# Patient Record
Sex: FEMALE | Race: WHITE | HISPANIC OR LATINO | URBAN - METROPOLITAN AREA
[De-identification: names, ages, dates, MRNs, and addresses within clinical notes are randomized per-mention and may not be internally consistent; named-entity substitution may affect disease eponyms.]

---

## 2023-11-07 ENCOUNTER — APPOINTMENT (OUTPATIENT)
Dept: OBSTETRICS AND GYNECOLOGY | Facility: CLINIC | Age: 58
End: 2023-11-07
Payer: COMMERCIAL

## 2023-11-20 ENCOUNTER — OFFICE VISIT (OUTPATIENT)
Dept: OBSTETRICS AND GYNECOLOGY | Facility: CLINIC | Age: 58
End: 2023-11-20
Payer: COMMERCIAL

## 2023-11-20 VITALS
WEIGHT: 204 LBS | DIASTOLIC BLOOD PRESSURE: 78 MMHG | SYSTOLIC BLOOD PRESSURE: 132 MMHG | HEIGHT: 62 IN | BODY MASS INDEX: 37.54 KG/M2

## 2023-11-20 DIAGNOSIS — R10.2 PAIN IN FEMALE PELVIS: Primary | ICD-10-CM

## 2023-11-20 DIAGNOSIS — N83.201 CYSTS OF BOTH OVARIES: ICD-10-CM

## 2023-11-20 DIAGNOSIS — N83.202 CYSTS OF BOTH OVARIES: ICD-10-CM

## 2023-11-20 PROCEDURE — 99204 OFFICE O/P NEW MOD 45 MIN: CPT | Performed by: OBSTETRICS & GYNECOLOGY

## 2023-11-20 PROCEDURE — 1036F TOBACCO NON-USER: CPT | Performed by: OBSTETRICS & GYNECOLOGY

## 2023-11-20 NOTE — PROGRESS NOTES
Subjective   Patient ID: Ava Turner is a 58 y.o. female who presents for Pelvic Pain.  stion  Information displayed in this report may not trend or trigger automated decision support.    US FEMALE PELVIS TRANSABD LTD  Order: [id number]  Impression    IMPRESSION:  Heterogeneous echogenicity of uterus but no discrete fibroid.  Bilateral ovarian lesion probably cysts and probable complex nabothian  cysts.  However given patient's postmenopausal state, correlation with  MRI would be beneficial to exclude other etiologies.          : YASMANI    Transcribe Date/Time: Jul 31 2023 11:35A    Dictated by : SHAKA FINNEGAN MD    This examination was interpreted and the report reviewed and  electronically signed by:  SHAKA FINNEGAN MD on Jul 31 2023 11:39AM  EST  Narrative    * * *Final Report* * *    DATE OF EXAM: Jul 27 2023  4:00PM      [facility]   [id number]  -  US FEMALE PELVIS TRANSABD  LTD  / ACCESSION #  [id number]    PROCEDURE REASON: OVARIAN CYST, RIGHT        * * * * Physician Interpretation * * * *     EXAMINATION:   TRANSVAGINAL AND LIMITED TRANSABDOMINAL FEMALE PELVIC  ULTRASOUND WITH DOPPLER ULTRASOUND:    CLINICAL HISTORY:  Ovarian cyst.    TECHNIQUE: Sonography of the pelvis was performed by transvaginal and  transabdominal (limited) techniques.  Images were obtained and stored in  a permanent archive.  MQ:  [id number]    COMPARISON: CT 06/03/2023 demonstrated 2.8 cm right pelvic cyst.    RESULT:    Uterus is anteverted measuring 5.7 x 2.7 x 5.1 cm.  Endometrial cavity thickness is 0.28 cm.  Uterus demonstrates heterogeneous echogenicity but without discrete  fibroid.    Right ovary measures 4.5 x 3.2 x 2.6 cm and demonstrated vascular flow.  There is a cystic structure within right ovary measuring 2.9 x 2.2 x 1.8  cm corresponding to abnormality noted on CT.    Left ovary measures 2.0 x 1.6 x 2.1 cm and demonstrated vascular flow.    There is a cystic structure within left ovary measuring 1.3 x 1.3 x  1.3  cm.    There are 2 complex cervical masses measuring 1.2 x 0.8 x 1.5 cm and 1.1  x 0.5 x 1.1 cm.  This may represent complex nabothian cyst although other  etiologies are not excluded.    No free fluid in the pelvis.  Exam End: -   Specimen Collected: 07/27/23 16:00 Last Resulted: 07/31/23 11:42  Received From[facility]  Result Received: 11/20/23 14:52    New patient.  58 years old.  Following up from a pelvic pain CT scan that she had in the summer that showed bilateral ovarian cysts.  Slightly larger on the right than the left.    Maybe she is having left-sided pelvic pain is worse when her bladder is full     13 years.  1 prior C-sections.  Prior endometrial ablation Nupathe current meds for blood pressure diabetes cholesterol.  Non-smoker.  No vaginal bleeding.  She works for a .    Commendations obtain follow-up ultrasound to make sure the cysts are not changing.  Order placed.  Schedule follow-up in 4 to 6 weeks    Pelvic Pain  The patient's primary symptoms include pelvic pain.       Review of Systems   Genitourinary:  Positive for pelvic pain.       Objective   Physical Exam  Constitutional:       Appearance: She is obese.   Neurological:      Mental Status: She is alert.         Assessment/Plan

## 2023-11-27 ENCOUNTER — HOSPITAL ENCOUNTER (INPATIENT)
Dept: RADIOLOGY | Facility: HOSPITAL | Age: 58
Discharge: HOME | End: 2023-11-27
Payer: COMMERCIAL

## 2023-11-27 DIAGNOSIS — Z13.6 ENCOUNTER FOR SCREENING FOR CARDIOVASCULAR DISORDERS: ICD-10-CM

## 2023-11-27 PROCEDURE — 75571 CT HRT W/O DYE W/CA TEST: CPT

## 2023-12-09 ENCOUNTER — APPOINTMENT (OUTPATIENT)
Dept: OBSTETRICS AND GYNECOLOGY | Facility: CLINIC | Age: 58
End: 2023-12-09
Payer: COMMERCIAL

## 2023-12-15 ENCOUNTER — APPOINTMENT (OUTPATIENT)
Dept: RADIOLOGY | Facility: HOSPITAL | Age: 58
End: 2023-12-15
Payer: COMMERCIAL

## 2024-01-08 ENCOUNTER — APPOINTMENT (OUTPATIENT)
Dept: OBSTETRICS AND GYNECOLOGY | Facility: CLINIC | Age: 59
End: 2024-01-08

## 2024-02-12 ENCOUNTER — APPOINTMENT (OUTPATIENT)
Dept: OBSTETRICS AND GYNECOLOGY | Facility: CLINIC | Age: 59
End: 2024-02-12